# Patient Record
Sex: MALE | Race: WHITE | NOT HISPANIC OR LATINO | Employment: OTHER | URBAN - METROPOLITAN AREA
[De-identification: names, ages, dates, MRNs, and addresses within clinical notes are randomized per-mention and may not be internally consistent; named-entity substitution may affect disease eponyms.]

---

## 2023-08-31 ENCOUNTER — OFFICE VISIT (OUTPATIENT)
Dept: FAMILY MEDICINE | Facility: CLINIC | Age: 43
End: 2023-08-31
Payer: COMMERCIAL

## 2023-08-31 VITALS
DIASTOLIC BLOOD PRESSURE: 81 MMHG | TEMPERATURE: 99.1 F | OXYGEN SATURATION: 96 % | WEIGHT: 234.2 LBS | RESPIRATION RATE: 16 BRPM | HEIGHT: 70 IN | SYSTOLIC BLOOD PRESSURE: 122 MMHG | HEART RATE: 89 BPM | BODY MASS INDEX: 33.53 KG/M2

## 2023-08-31 DIAGNOSIS — J40 BRONCHITIS: ICD-10-CM

## 2023-08-31 DIAGNOSIS — G89.4 CHRONIC PAIN SYNDROME: ICD-10-CM

## 2023-08-31 DIAGNOSIS — T85.898A ABDOMINAL ADHESIONS DUE TO IMPLANTED MESH: ICD-10-CM

## 2023-08-31 DIAGNOSIS — K66.0 ABDOMINAL ADHESIONS DUE TO IMPLANTED MESH: ICD-10-CM

## 2023-08-31 DIAGNOSIS — R10.32 ABDOMINAL PAIN, LEFT LOWER QUADRANT: Primary | ICD-10-CM

## 2023-08-31 PROCEDURE — 99204 OFFICE O/P NEW MOD 45 MIN: CPT | Performed by: INTERNAL MEDICINE

## 2023-08-31 RX ORDER — VENLAFAXINE HYDROCHLORIDE 37.5 MG/1
CAPSULE, EXTENDED RELEASE ORAL
COMMUNITY
Start: 2023-01-05

## 2023-08-31 RX ORDER — AZITHROMYCIN 250 MG/1
TABLET, FILM COATED ORAL
Qty: 6 TABLET | Refills: 0 | Status: SHIPPED | OUTPATIENT
Start: 2023-08-31 | End: 2023-09-05

## 2023-08-31 RX ORDER — PREDNISONE 20 MG/1
40 TABLET ORAL DAILY
Qty: 10 TABLET | Refills: 0 | Status: SHIPPED | OUTPATIENT
Start: 2023-08-31 | End: 2023-09-05

## 2023-08-31 RX ORDER — OXYCODONE HYDROCHLORIDE 5 MG/1
5 TABLET ORAL 2 TIMES DAILY PRN
Qty: 12 TABLET | Refills: 0 | Status: SHIPPED | OUTPATIENT
Start: 2023-08-31 | End: 2023-09-06

## 2023-08-31 ASSESSMENT — PAIN SCALES - GENERAL: PAINLEVEL: EXTREME PAIN (9)

## 2023-08-31 NOTE — PROGRESS NOTES
Assessment & Plan     Abdominal pain, left lower quadrant  Has got chronic abdominal pain in the left lower quadrant  Started a couple of weeks after the surgery for his left inguinal hernia  He had a open hernia surgery with mesh placement  Since then he has been having pain  There is always a baseline pain of around 4 out of 10 and then whenever he does any exertional activity like exercise or sex it gets worse  Pain was so bad that he had to go to the ER sometimes  He was evaluated at [facility] and [facility] for this  He had multiple abdominal CTs which were unremarkable  They were wondering if the pain is from either scar tissue or entrapment of any nerve  He does not want to take any narcotics or gabapentin for pain as they make him drowsy  He wants to only take the oxycodone when it is absolutely necessary and he is in severe pain  He wants to get the mesh removed if possible and wants a second opinion about this pain  I told him that I can certainly consult the general surgery at [place] to see if they will evaluate him  The surgeon who performed his surgery Gabrielle has since retired so he was going to another surgeon in [facility] just for this pain and now he wants to shift his care to [place]  - Adult General Surg Referral; Future  - oxyCODONE (ROXICODONE) 5 MG tablet; Take 1 tablet (5 mg) by mouth 2 times daily as needed for pain    Abdominal adhesions due to implanted mesh  As above the etiology of this pain remains unclear but he is given history of a baseline constant pain  of 4 out of 10 which radiates up to his abdomen and down to the groin  Certainly they were considering elevations and also entrapment of any now as a possibility  - Adult General Surg Referral; Future    Chronic pain syndrome  He was referred to chronic pain service at [facility] but has not seen them  I see that they are considering ablation versus no blockage for this  I told him that he needs to look at other pain  "clinics as well  - Pain Management  Referral; Future  - oxyCODONE (ROXICODONE) 5 MG tablet; Take 1 tablet (5 mg) by mouth 2 times daily as needed for pain    Bronchitis  He has been having some cough and bringing up yellow phlegm and wheezing at times  Home COVID test is negative  He has bronchitis  - azithromycin (ZITHROMAX) 250 MG tablet; Take 2 tablets (500 mg) by mouth daily for 1 day, THEN 1 tablet (250 mg) daily for 4 days.  - predniSONE (DELTASONE) 20 MG tablet; Take 2 tablets (40 mg) by mouth daily for 5 days      30 minutes spent by me on the date of the encounter doing chart review, history and exam, documentation and further activities per the note       Nicotine/Tobacco Cessation:  He reports that he has been smoking cigarettes. His smokeless tobacco use includes chew.  Nicotine/Tobacco Cessation Plan:   Information offered: Patient not interested at this time      BMI:   Estimated body mass index is 33.6 kg/m  as calculated from the following:    Height as of this encounter: 1.778 m (5' 10\").    Weight as of this encounter: 106.2 kg (234 lb 3.2 oz).   Weight management plan: Discussed healthy diet and exercise guidelines        Edy Vela MD  [facility]    Amber Parra is a 43 year old, presenting for the following health issues:  Abdominal Pain      8/31/2023    12:31 PM   Additional Questions   Roomed by fortunato   Accompanied by self         8/31/2023    12:31 PM   Patient Reported Additional Medications   Patient reports taking the following new medications no       History of Present Illness       He eats 2-3 servings of fruits and vegetables daily.He consumes 2 sweetened beverage(s) daily.He exercises with enough effort to increase his heart rate 30 to 60 minutes per day.  He exercises with enough effort to increase his heart rate 5 days per week.   He is taking medications regularly.       Was seen 18 times with Replaced by [facility] system. Stated had " "hernia repair done. Having lower abdominal issue. Resting pain at 4/10. If moving or any activities 9-10/10. Feels like groin area being pulled. Request for referral to surgeon.           Review of Systems   Constitutional, HEENT, cardiovascular, pulmonary, gi and gu systems are negative, except as otherwise noted.      Objective    /81 (BP Location: Left arm, Patient Position: Sitting, Cuff Size: Adult Regular)   Pulse 89   Temp 99.1  F (37.3  C) (Oral)   Resp 16   Ht 1.778 m (5' 10\")   Wt 106.2 kg (234 lb 3.2 oz)   SpO2 96%   BMI 33.60 kg/m    Body mass index is 33.6 kg/m .  Physical Exam   GENERAL: healthy, alert and no distress  NECK: no adenopathy, no asymmetry, masses, or scars and thyroid normal to palpation  RESP: Scattered wheeze  CV: regular rate and rhythm, normal S1 S2, no S3 or S4, no murmur, click or rub, no peripheral edema and peripheral pulses strong  ABDOMEN: Tender in the left lower quadrant  There is an area of nodularity felt in the left lower quadrant where he had the surgery just above the inguinal ligament  The nodularity extends about 5 to 6 cm transversely  MS: no gross musculoskeletal defects noted, no edema                      "

## 2023-09-05 ENCOUNTER — TELEPHONE (OUTPATIENT)
Dept: FAMILY MEDICINE | Facility: CLINIC | Age: 43
End: 2023-09-05
Payer: COMMERCIAL

## 2023-09-05 NOTE — CONFIDENTIAL NOTE
Patient needs an appointment  He can come for in person appointment  He might need some imaging and blood work  Please call and schedule for in person appointment  Okay to take a same-day visit

## 2023-09-05 NOTE — TELEPHONE ENCOUNTER
Patient stated that the prednisone and Z-pack he had last week did help but now that it is finished he is still having symptoms.  Coughing along with coughing up phlegm.  Also stated he is having more difficulty breathing then when he was on the Z pack and prednisone.      Routing to provider to review and advise on if patient needs another appointment or if will prescribe refills on Prednisone and Z-pack.      Kristina Kjellberg, MSN, RN  [facility] Primary Care Triage

## 2023-09-05 NOTE — TELEPHONE ENCOUNTER
Called patient and relayed provider recommendation below, patient verbalized understanding. Patient requesting to be seen as soon as able by Dr. Vela -  let patient know appt available tomorrow at 1:40 pm at the [facility], patient states he'd like this appt - aware of location and appt time. No further questions or concerns.      PATRICE VazN, RN  [facility]

## 2023-09-05 NOTE — TELEPHONE ENCOUNTER
REFERRAL INFORMATION:  Referring Provider:  Dr. Edy Vela   Referring Clinic:  [facility] FP/IM/PEDS  Reason for Visit/Diagnosis: abdominal pain, h/o abdominal hernia       FUTURE VISIT INFORMATION:  Appointment Date: 09-11-23  Appointment Time: @ 12pm      NOTES RECORD STATUS  DETAILS   OFFICE NOTE from Referring Provider Internal 08-31-23 Dr. Edy Vela   OFFICE NOTE from Other Specialists Care Everywhere 07-20-23 Mac Martinez APRN CNP  07-13-23 Qing Gonzales PA-C   HOSPITAL DISCHARGE SUMMARY/ ED VISITS  NO    OPERATIVE REPORT NO    ENDOSCOPY (EGD)  Care everywhere 07-06-23   PERTINENT LABS Care Everywhere CBC/diff: 07-15-23, 06-13-23, 02-06-23, 02-12-22, 01-28-22  BMP: 07-15-23, 02-06-23, 02-12-22, 10-30-19   PATHOLOGY REPORTS (RELATED) Care Everywhere 07-06-23   IMAGING (CT, MRI, US, XR)  Care Everywhere CT abd/pelvis: 07-15-23, 06-23-23  US abd: 06-14-23, 04-12-18  XR abd: 06-13-23

## 2023-09-06 ENCOUNTER — OFFICE VISIT (OUTPATIENT)
Dept: FAMILY MEDICINE | Facility: CLINIC | Age: 43
End: 2023-09-06
Payer: COMMERCIAL

## 2023-09-06 ENCOUNTER — ANCILLARY PROCEDURE (OUTPATIENT)
Dept: GENERAL RADIOLOGY | Facility: CLINIC | Age: 43
End: 2023-09-06
Attending: INTERNAL MEDICINE
Payer: COMMERCIAL

## 2023-09-06 VITALS
HEIGHT: 70 IN | HEART RATE: 62 BPM | DIASTOLIC BLOOD PRESSURE: 80 MMHG | OXYGEN SATURATION: 95 % | WEIGHT: 230.1 LBS | TEMPERATURE: 98.6 F | SYSTOLIC BLOOD PRESSURE: 130 MMHG | RESPIRATION RATE: 16 BRPM | BODY MASS INDEX: 32.94 KG/M2

## 2023-09-06 DIAGNOSIS — J40 BRONCHITIS: ICD-10-CM

## 2023-09-06 DIAGNOSIS — J45.21 MILD INTERMITTENT ASTHMA WITH EXACERBATION: ICD-10-CM

## 2023-09-06 DIAGNOSIS — R06.02 SOB (SHORTNESS OF BREATH): Primary | ICD-10-CM

## 2023-09-06 DIAGNOSIS — R06.02 SOB (SHORTNESS OF BREATH): ICD-10-CM

## 2023-09-06 PROCEDURE — 71046 X-RAY EXAM CHEST 2 VIEWS: CPT | Mod: TC | Performed by: RADIOLOGY

## 2023-09-06 PROCEDURE — 99214 OFFICE O/P EST MOD 30 MIN: CPT | Performed by: INTERNAL MEDICINE

## 2023-09-06 RX ORDER — ALBUTEROL SULFATE 90 UG/1
2 AEROSOL, METERED RESPIRATORY (INHALATION) EVERY 6 HOURS PRN
Qty: 18 G | Refills: 3 | Status: SHIPPED | OUTPATIENT
Start: 2023-09-06 | End: 2024-01-16

## 2023-09-06 RX ORDER — DOXYCYCLINE HYCLATE 100 MG
100 TABLET ORAL 2 TIMES DAILY
Qty: 14 TABLET | Refills: 0 | Status: SHIPPED | OUTPATIENT
Start: 2023-09-06

## 2023-09-06 RX ORDER — BUDESONIDE 0.5 MG/2ML
0.5 INHALANT ORAL 2 TIMES DAILY
Qty: 60 ML | Refills: 0 | Status: SHIPPED | OUTPATIENT
Start: 2023-09-06

## 2023-09-06 ASSESSMENT — PAIN SCALES - GENERAL: PAINLEVEL: NO PAIN (0)

## 2023-09-06 NOTE — PROGRESS NOTES
Assessment & Plan     SOB (shortness of breath)  X-ray did not reveal any evidence of pneumonia  - XR Chest 2 Views; Future    Mild intermittent asthma with exacerbation  He has asthma which got exacerbated by current infection  He is having a lot of cough and wheezing  I will start him on some Pulmicort nebulizers as in the past when he had steroids that resulted in his immunity is going down and he catching in the pneumonia  Also for future reference he can use the Pulmicort inhaler as needed for a few days when his asthma gets worse  Currently he only takes albuterol inhaler as needed as that is doing the job until the current infection  - budesonide (PULMICORT) 0.5 MG/2ML neb solution; Take 2 mLs (0.5 mg) by nebulization 2 times daily  - albuterol (PROAIR HFA/PROVENTIL HFA/VENTOLIN HFA) 108 (90 Base) MCG/ACT inhaler; Inhale 2 puffs into the lungs every 6 hours as needed for shortness of breath, wheezing or cough  - budesonide (PULMICORT FLEXHALER) 90 MCG/ACT inhaler; Inhale 2 puffs into the lungs 2 times daily    Bronchitis  We tried a a Z-Reinier but he still having cough and discolored sputum  We will try some doxycycline  - doxycycline hyclate (VIBRA-TABS) 100 MG tablet; Take 1 tablet (100 mg) by mouth 2 times daily      25 minutes spent by me on the date of the encounter doing chart review, history and exam, documentation and further activities per the note           Edy Vela MD  [facility]    Amber Parra is a 43 year old, presenting for the following health issues:  Follow Up (Medication refill Prednisone and Z-pack.)        9/6/2023     1:31 PM   Additional Questions   Roomed by Aleida BURTON   Accompanied by self       History of Present Illness       He eats 2-3 servings of fruits and vegetables daily.He consumes 2 sweetened beverage(s) daily.He exercises with enough effort to increase his heart rate 30 to 60 minutes per day.  He exercises with enough effort to increase  "his heart rate 5 days per week.   He is taking medications regularly.               Review of Systems   Constitutional, HEENT, cardiovascular, pulmonary, gi and gu systems are negative, except as otherwise noted.      Objective    /80 (BP Location: Right arm, Patient Position: Sitting, Cuff Size: Adult Regular)   Pulse 62   Temp 98.6  F (37  C) (Oral)   Resp 16   Ht 1.778 m (5' 10\")   Wt 104.4 kg (230 lb 1.6 oz)   SpO2 95%   BMI 33.02 kg/m    Body mass index is 33.02 kg/m .  Physical Exam   GENERAL: healthy, alert and no distress  NECK: no adenopathy, no asymmetry, masses, or scars and thyroid normal to palpation  RESP: Scattered wheeze  CV: regular rate and rhythm, normal S1 S2, no S3 or S4, no murmur, click or rub, no peripheral edema and peripheral pulses strong  ABDOMEN: soft, nontender, no hepatosplenomegaly, no masses and bowel sounds normal  MS: no gross musculoskeletal defects noted, no edema                      "

## 2023-09-07 ENCOUNTER — TELEPHONE (OUTPATIENT)
Dept: FAMILY MEDICINE | Facility: CLINIC | Age: 43
End: 2023-09-07
Payer: COMMERCIAL

## 2023-09-07 DIAGNOSIS — J45.21 MILD INTERMITTENT ASTHMA WITH EXACERBATION: ICD-10-CM

## 2023-09-07 NOTE — TELEPHONE ENCOUNTER
Prior Authorization Retail Medication Request    Medication/Dose: budesonide (PULMICORT FLEXHALER) 90 MCG/ACT inhaler  ICD code (if different than what is on RX):     Previously Tried and Failed:    Rationale:    Mild intermittent asthma with exacerbatio          Insurance Name:  [employer]   Insurance ID:  [id number]       Pharmacy Information (if different than what is on RX)  Name:    Phone:       Go.[email or web address]/login  Key: [id number]  Last Name: sAh  : 1980

## 2023-09-08 NOTE — TELEPHONE ENCOUNTER
Central Prior Authorization Team   Phone: [phone]    PA Initiation    Medication: PULMICORT FLEXHALER 90 MCG/ACT IN AEPB  Insurance Company: [employer] - Phone [phone] Fax [phone]  Pharmacy Filling the Rx: [id number] [address] [facility]   Filling Pharmacy Phone: [phone]  Filling Pharmacy Fax:    Start Date: 9/8/2023

## 2023-09-08 NOTE — TELEPHONE ENCOUNTER
Prior Authorization Approval    Medication: PULMICORT FLEXHALER 90 MCG/ACT IN AEPB  Authorization Effective Date: 8/9/2023  Authorization Expiration Date: 9/7/2026  Insurance Company: [employer] - Phone [phone] Fax [phone]  Which Pharmacy is filling the prescription: [id number] [address] [facility]   Pharmacy Notified: Yes  Patient Notified: Yes (pharmacy will notify patient when ready)

## 2023-09-11 ENCOUNTER — MYC MEDICAL ADVICE (OUTPATIENT)
Dept: FAMILY MEDICINE | Facility: CLINIC | Age: 43
End: 2023-09-11

## 2023-09-11 ENCOUNTER — TELEPHONE (OUTPATIENT)
Dept: FAMILY MEDICINE | Facility: CLINIC | Age: 43
End: 2023-09-11

## 2023-09-11 ENCOUNTER — OFFICE VISIT (OUTPATIENT)
Dept: SURGERY | Facility: CLINIC | Age: 43
End: 2023-09-11
Attending: INTERNAL MEDICINE
Payer: COMMERCIAL

## 2023-09-11 ENCOUNTER — PRE VISIT (OUTPATIENT)
Dept: SURGERY | Facility: CLINIC | Age: 43
End: 2023-09-11

## 2023-09-11 VITALS
WEIGHT: 235.2 LBS | SYSTOLIC BLOOD PRESSURE: 122 MMHG | HEART RATE: 65 BPM | HEIGHT: 70 IN | OXYGEN SATURATION: 98 % | DIASTOLIC BLOOD PRESSURE: 78 MMHG | BODY MASS INDEX: 33.67 KG/M2

## 2023-09-11 DIAGNOSIS — T85.898A ABDOMINAL ADHESIONS DUE TO IMPLANTED MESH: ICD-10-CM

## 2023-09-11 DIAGNOSIS — K66.0 ABDOMINAL ADHESIONS DUE TO IMPLANTED MESH: ICD-10-CM

## 2023-09-11 DIAGNOSIS — R10.32 ABDOMINAL PAIN, LEFT LOWER QUADRANT: ICD-10-CM

## 2023-09-11 PROCEDURE — 99203 OFFICE O/P NEW LOW 30 MIN: CPT | Performed by: SURGERY

## 2023-09-11 ASSESSMENT — ENCOUNTER SYMPTOMS
NIGHT SWEATS: 0
HALLUCINATIONS: 0
HEARTBURN: 0
TASTE DISTURBANCE: 0
PANIC: 0
DISTURBANCES IN COORDINATION: 0
POLYDIPSIA: 0
SMELL DISTURBANCE: 0
BLOATING: 0
MUSCLE WEAKNESS: 1
BACK PAIN: 1
ALTERED TEMPERATURE REGULATION: 0
EXERCISE INTOLERANCE: 0
SEIZURES: 0
JAUNDICE: 0
LOSS OF CONSCIOUSNESS: 0
DIARRHEA: 0
DIFFICULTY URINATING: 0
FATIGUE: 0
INSOMNIA: 0
ABDOMINAL PAIN: 1
MUSCLE CRAMPS: 1
FLANK PAIN: 0
INCREASED ENERGY: 0
SYNCOPE: 0
NAUSEA: 0
SPUTUM PRODUCTION: 1
LEG PAIN: 0
NAIL CHANGES: 0
SORE THROAT: 0
SHORTNESS OF BREATH: 1
DECREASED CONCENTRATION: 0
WEIGHT LOSS: 0
NECK MASS: 0
LEG SWELLING: 0
DOUBLE VISION: 0
NECK PAIN: 0
ORTHOPNEA: 0
ARTHRALGIAS: 0
NERVOUS/ANXIOUS: 0
DYSPNEA ON EXERTION: 1
SINUS PAIN: 0
WEAKNESS: 0
MEMORY LOSS: 0
CONSTIPATION: 0
FEVER: 0
SKIN CHANGES: 0
WHEEZING: 1
EYE IRRITATION: 0
CHILLS: 0
HOARSE VOICE: 0
PALPITATIONS: 0
COUGH DISTURBING SLEEP: 1
SPEECH CHANGE: 0
TINGLING: 0
DYSURIA: 0
POOR WOUND HEALING: 0
TREMORS: 0
SLEEP DISTURBANCES DUE TO BREATHING: 0
EYE PAIN: 0
VOMITING: 0
HEMOPTYSIS: 0
DIZZINESS: 0
DECREASED APPETITE: 0
HEMATURIA: 0
SINUS CONGESTION: 0
CLAUDICATION: 0
DEPRESSION: 0
SNORES LOUDLY: 0
POLYPHAGIA: 0
BOWEL INCONTINENCE: 0
HYPOTENSION: 0
EYE REDNESS: 0
NUMBNESS: 0
SWOLLEN GLANDS: 0
HYPERTENSION: 0
RECTAL PAIN: 0
JOINT SWELLING: 0
BLOOD IN STOOL: 0
BRUISES/BLEEDS EASILY: 0
EYE WATERING: 0
COUGH: 1
TROUBLE SWALLOWING: 0
POSTURAL DYSPNEA: 1
STIFFNESS: 0
TACHYCARDIA: 0
MYALGIAS: 1
HEADACHES: 0
WEIGHT GAIN: 0
EXTREMITY NUMBNESS: 0
PARALYSIS: 0
LIGHT-HEADEDNESS: 0

## 2023-09-11 ASSESSMENT — PAIN SCALES - GENERAL: PAINLEVEL: MODERATE PAIN (4)

## 2023-09-11 NOTE — NURSING NOTE
"Chief Complaint   Patient presents with    New Patient     Abdominal pain       Vitals:    09/11/23 1151   BP: 122/78   BP Location: Left arm   Patient Position: Sitting   Cuff Size: Adult Large   Pulse: 65   SpO2: 98%   Weight: 106.7 kg (235 lb 3.2 oz)   Height: 1.778 m (5' 10\")       Body mass index is 33.75 kg/m .                          Maximilian Orosco, EMT    "

## 2023-09-11 NOTE — TELEPHONE ENCOUNTER
"  General Call      Reason for Call:  Patient     What are your questions or concerns:  Patient went to his surgical consult today and he wants to know who else he can go to as the surgeon he met today will not remove his \"hernia mesh\".    Date of last appointment with provider: 9/6/2023. 8/31/2023 when referral was placed    Could we send this information to you in [facility] or would you prefer to receive a phone call?:   Patient would prefer a phone call   Okay to leave a detailed message?: Yes at Cell number on file:    Telephone Information:   Mobile [phone]     Demetria Villalobos [facility]   Primary Care    "

## 2023-09-11 NOTE — LETTER
"9/11/2023       RE: Fidel Aleman  [address]  [place] [address]     Dear Colleague,    Thank you for referring your patient, Fidel Aleman, to the [facility] GENER[facility] [place] at [facility]. Please see a copy of my visit note below.    New General Surgery Consultation Note        Fidel Aleman  [id number]  1980 September 11, 2023     Requesting Provider: Edy Vela     Dear Dr Moran, [facility],     I had the pleasure of seeing your patient, Fidel Aleman is a 43 year old male who presents to clinic today for the following health issues      CHIEF COMPLAINT:  L groin pain    Assessment & Plan  Problem List Items Addressed This Visit    None  Visit Diagnoses       Abdominal pain, left lower quadrant        Relevant Orders    PAIN INJECTION EVAL/TREAT/FOLLOW UP    Abdominal adhesions due to implanted mesh             30 minutes spent by me on the date of the encounter doing chart review, history and exam, documentation and further activities per the note    [facility] groin pain s/p open IHR 2021. Explained thoroughly the challenge in taking mesh out, due to scarring. Explained Plan A would be pain injections in the area. If no relief, would discuss case at [facility] prior to offering mesh removal. I explained the inherent risks of mesh removal, including structural injury or persistence of pain.      Alphonso Reis MD      HISTORY OF PRESENT ILLNESS:  43M, hx sobriety x 2 years, s/p open L IH mesh 2021, Dr Hunter    Presents with chronic pain in the L groin, limiting his ability to move around, do activities he likes. He also notes a plapable L groin \"ridge\". He is quite frustrated at this chronic pain, which has been without effective relief.      LOCATION:   L groin        Review of Systems     Constitutional:  Negative for fever, chills, weight loss, weight gain, fatigue, decreased appetite, " night sweats, recent stressors, height gain, height loss, post-operative complications, incisional pain, hallucinations, increased energy, hyperactivity and confused.   HENT:  Negative for ear pain, hearing loss, tinnitus, nosebleeds, trouble swallowing, hoarse voice, mouth sores, sore throat, ear discharge, tooth pain, gum tenderness, taste disturbance, smell disturbance, hearing aid, bleeding gums, dry mouth, sinus pain, sinus congestion and neck mass.    Eyes:  Negative for double vision, eye pain, redness, eye pain, decreased vision, eye watering, eye bulging, eye dryness, flashing lights, spots, floaters, strabismus, tunnel vision, jaundice and eye irritation.   Respiratory:   Positive for cough, sputum production, shortness of breath, wheezing, dyspnea on exertion, cough disturbing sleep and postural dyspnea. Negative for hemoptysis, sleep disturbances due to breathing and snores loudly.    Cardiovascular:  Positive for dyspnea on exertion. Negative for chest pain, palpitations, orthopnea, claudication, leg swelling, fingers/toes turn blue, hypertension, hypotension, syncope, history of heart murmur, chest pain on exertion, chest pain at rest, pacemaker, few scattered varicosities, leg pain, sleep disturbances due to breathing, tachycardia, light-headedness, exercise intolerance and edema.   Gastrointestinal:  Positive for abdominal pain. Negative for heartburn, nausea, vomiting, diarrhea, constipation, blood in stool, melena, rectal pain, bloating, bowel incontinence, jaundice, coffee ground emesis and change in stool.   Genitourinary:  Negative for bladder incontinence, dysuria, urgency, hematuria, flank pain, difficulty urinating, nocturia, voiding less frequently, scrotal pain, ulcerations, penile discharge, male genitourinary complaint and reduced libido.   Musculoskeletal:  Positive for myalgias, back pain, muscle cramps and muscle weakness. Negative for joint swelling, arthralgias, stiffness, neck pain,  "bone pain and fracture.   Skin:  Negative for nail changes, itching, poor wound healing, rash, hair changes, skin changes, acne, warts, poor wound healing, scarring, flaky skin, Raynaud's phenomenon, sensitivity to sunlight and skin thickening.   Neurological:  Negative for dizziness, tingling, tremors, speech change, seizures, loss of consciousness, weakness, light-headedness, numbness, headaches, disturbances in coordination, extremity numbness, memory loss, difficulty walking and paralysis.   Endo/Heme:  Negative for anemia, swollen glands and bruises/bleeds easily.   Psychiatric/Behavioral:  Negative for depression, hallucinations, memory loss, decreased concentration, mood swings and panic attacks.    Endocrine:  Negative for altered temperature regulation, polyphagia, polydipsia, unwanted hair growth and change in facial hair.      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 Neurofibromas both lower legs.    Metabolic syndrome 01/03/2012   GERD (gastroesophageal reflux disease) 01/03/2012   Abdominal cramping 01/03/2012   Overview Note:   Abdominal wall ? Onset approximately Jan 2011    Mild intermittent asthma ([facility]) 12/13/2011   Morbid obesity ([facility]) 09/01/2011   Overview Note:   BMI 45      Social History     Socioeconomic History   Marital status: Unknown   Spouse name: Not on file   Number of children: Not on file   Years of education: Not on file   Highest education level: Not on file   Occupational History   Occupation: Contractor - Owns construction company   Tobacco Use   Smoking status: Former   Packs/day: 0.10   Years: 6.00   Pack years: 0.60   Types: Cigarettes " "  Smokeless tobacco: Current   Types: Chew   Tobacco comments:   2 1/2 years ago quit   Vaping Use   Vaping Use: Never used   Substance and Sexual Activity   Alcohol use: No   Drug use: No   Sexual activity: Not on file   Other Topics Concern   Not on file   Social History Narrative   Not on file     Social Determinants of Health     Financial Resource Strain: Not on file   Food Insecurity: Not on file   Transportation Needs: Not on file      MEDICATIONS:  Current Outpatient Medications   Medication    albuterol (PROAIR HFA/PROVENTIL HFA/VENTOLIN HFA) 108 (90 Base) MCG/ACT inhaler    budesonide (PULMICORT FLEXHALER) 90 MCG/ACT inhaler    budesonide (PULMICORT) 0.5 MG/2ML neb solution    doxycycline hyclate (VIBRA-TABS) 100 MG tablet    venlafaxine (EFFEXOR XR) 37.5 MG 24 hr capsule     No current facility-administered medications for this visit.        ALLERGIES:  Allergies   Allergen Reactions    Hydrocodone-Acetaminophen Itching, Nausea and GI Disturbance    Penicillins Unknown     amoxicillin is ok and has tried        SOCIAL HISTORY:  Social History     Socioeconomic History    Marital status:      Spouse name: None    Number of children: None    Years of education: None    Highest education level: None   Tobacco Use    Smoking status: Some Days     Types: Cigarettes    Smokeless tobacco: Current     Types: Chew    Tobacco comments:     Couple cigs a day   Vaping Use    Vaping Use: Never used       FAMILY HISTORY:  No family history on file.     CT reviewed    PHYSICAL EXAM:  Objective   /78 (BP Location: Left arm, Patient Position: Sitting, Cuff Size: Adult Large)   Pulse 65   Ht 1.778 m (5' 10\")   Wt 106.7 kg (235 lb 3.2 oz)   SpO2 98%   BMI 33.75 kg/m    /78 (BP Location: Left arm, Patient Position: Sitting, Cuff Size: Adult Large)   Pulse 65   Ht 1.778 m (5' 10\")   Wt 106.7 kg (235 lb 3.2 oz)   SpO2 98%   BMI 33.75 kg/m    Body mass index is 33.75 kg/m .  Physical " "Exam  Musculoskeletal:         General: No edema.        L groin with scar, no palp defect. Palpable \"Ridge\" of tissue          Again, thank you for allowing me to participate in the care of your patient.      Sincerely,    Alphonso Reis MD    "

## 2023-09-11 NOTE — PROGRESS NOTES
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 Explained thoroughly the challenge in taking mesh out, due to scarring. Explained Plan A would be pain injections in the area. If no relief, would discuss case at [facility] prior to offering mesh removal. I explained the inherent risks of mesh removal, including structural injury or persistence of pain.      Alphonso Reis MD      HISTORY OF PRESENT ILLNESS:  43M, hx sobriety x 2 years, s/p open L IH mesh 2021, Dr Hunter    Presents with chronic pain in the L groin, limiting his ability to move around, do activities he likes. He also notes a plapable L groin \"ridge\". He is quite frustrated at this chronic pain, which has been without effective relief.      LOCATION:   L groin        Review of Systems     Constitutional:  Negative for fever, chills, weight loss, weight gain, fatigue, decreased appetite, night sweats, recent stressors, height gain, height loss, post-operative complications, incisional pain, hallucinations, increased energy, hyperactivity and confused.   HENT:  Negative for ear pain, hearing loss, tinnitus, nosebleeds, trouble swallowing, hoarse voice, mouth sores, sore throat, ear discharge, tooth pain, gum " tenderness, taste disturbance, smell disturbance, hearing aid, bleeding gums, dry mouth, sinus pain, sinus congestion and neck mass.    Eyes:  Negative for double vision, eye pain, redness, eye pain, decreased vision, eye watering, eye bulging, eye dryness, flashing lights, spots, floaters, strabismus, tunnel vision, jaundice and eye irritation.   Respiratory:   Positive for cough, sputum production, shortness of breath, wheezing, dyspnea on exertion, cough disturbing sleep and postural dyspnea. Negative for hemoptysis, sleep disturbances due to breathing and snores loudly.    Cardiovascular:  Positive for dyspnea on exertion. Negative for chest pain, palpitations, orthopnea, claudication, leg swelling, fingers/toes turn blue, hypertension, hypotension, syncope, history of heart murmur, chest pain on exertion, chest pain at rest, pacemaker, few scattered varicosities, leg pain, sleep disturbances due to breathing, tachycardia, light-headedness, exercise intolerance and edema.   Gastrointestinal:  Positive for abdominal pain. Negative for heartburn, nausea, vomiting, diarrhea, constipation, blood in stool, melena, rectal pain, bloating, bowel incontinence, jaundice, coffee ground emesis and change in stool.   Genitourinary:  Negative for bladder incontinence, dysuria, urgency, hematuria, flank pain, difficulty urinating, nocturia, voiding less frequently, scrotal pain, ulcerations, penile discharge, male genitourinary complaint and reduced libido.   Musculoskeletal:  Positive for myalgias, back pain, muscle cramps and muscle weakness. Negative for joint swelling, arthralgias, stiffness, neck pain, bone pain and fracture.   Skin:  Negative for nail changes, itching, poor wound healing, rash, hair changes, skin changes, acne, warts, poor wound healing, scarring, flaky skin, Raynaud's phenomenon, sensitivity to sunlight and skin thickening.   Neurological:  Negative for dizziness, tingling, tremors, speech change,  "seizures, loss of consciousness, weakness, light-headedness, numbness, headaches, disturbances in coordination, extremity numbness, memory loss, difficulty walking and paralysis.   Endo/Heme:  Negative for anemia, swollen glands and bruises/bleeds easily.   Psychiatric/Behavioral:  Negative for depression, hallucinations, memory loss, decreased concentration, mood swings and panic attacks.    Endocrine:  Negative for altered temperature regulation, polyphagia, polydipsia, unwanted hair growth and change in facial hair.      atient Active Problem List   Diagnosis Date Noted   Dyslipidemia ([facility]) 09/01/2011   Overview Note:   HDL 27, Trigs 400    Anxiety ([facility]) 01/05/2023   Mild intermittent asthma with (acute) exacerbation ([facility]) 01/10/2022   Injury of lower back 11/29/2012   Overview Note:   ICD 10    Gout 02/19/2012   Neurofibromatosis ([facility]) 01/03/2012   Overview Note:   3\" cafe au lait spot in RLQ. Neurofibromas both lower legs.    Metabolic syndrome 01/03/2012   GERD (gastroesophageal reflux disease) 01/03/2012   Abdominal cramping 01/03/2012   Overview Note:   Abdominal wall ? Onset approximately Jan 2011    Mild intermittent asthma ([facility]) 12/13/2011   Morbid obesity ([facility]) 09/01/2011   Overview Note:   BMI 45      Social History     Socioeconomic History   Marital status: Unknown   Spouse name: Not on file   Number of children: Not on file   Years of education: Not on file   Highest education level: Not on file   Occupational History   Occupation: Contractor - [employer]   Tobacco Use   Smoking status: Former   Packs/day: 0.10   Years: 6.00   Pack years: 0.60   Types: Cigarettes   Smokeless tobacco: Current   Types: Chew   Tobacco comments:   2 1/2 years ago quit   Vaping Use   Vaping Use: Never used   Substance and Sexual Activity   Alcohol use: No   Drug use: No   Sexual activity: Not on file   Other Topics Concern   Not on file   Social History Narrative   Not on file     Social Determinants of " "Health     Financial Resource Strain: Not on file   Food Insecurity: Not on file   Transportation Needs: Not on file      MEDICATIONS:  Current Outpatient Medications   Medication    albuterol (PROAIR HFA/PROVENTIL HFA/VENTOLIN HFA) 108 (90 Base) MCG/ACT inhaler    budesonide (PULMICORT FLEXHALER) 90 MCG/ACT inhaler    budesonide (PULMICORT) 0.5 MG/2ML neb solution    doxycycline hyclate (VIBRA-TABS) 100 MG tablet    venlafaxine (EFFEXOR XR) 37.5 MG 24 hr capsule     No current facility-administered medications for this visit.        ALLERGIES:  Allergies   Allergen Reactions    Hydrocodone-Acetaminophen Itching, Nausea and GI Disturbance    Penicillins Unknown     amoxicillin is ok and has tried        SOCIAL HISTORY:  Social History     Socioeconomic History    Marital status:      Spouse name: None    Number of children: None    Years of education: None    Highest education level: None   Tobacco Use    Smoking status: Some Days     Types: Cigarettes    Smokeless tobacco: Current     Types: Chew    Tobacco comments:     Couple cigs a day   Vaping Use    Vaping Use: Never used       FAMILY HISTORY:  No family history on file.     CT reviewed    PHYSICAL EXAM:  Objective    /78 (BP Location: Left arm, Patient Position: Sitting, Cuff Size: Adult Large)   Pulse 65   Ht 1.778 m (5' 10\")   Wt 106.7 kg (235 lb 3.2 oz)   SpO2 98%   BMI 33.75 kg/m    /78 (BP Location: Left arm, Patient Position: Sitting, Cuff Size: Adult Large)   Pulse 65   Ht 1.778 m (5' 10\")   Wt 106.7 kg (235 lb 3.2 oz)   SpO2 98%   BMI 33.75 kg/m    Body mass index is 33.75 kg/m .  Physical Exam  Musculoskeletal:         General: No edema.        L groin with scar, no palp defect. Palpable \"Ridge\" of tissue      Sincerely,     Alphonso Reis MD    "

## 2023-09-12 NOTE — TELEPHONE ENCOUNTER
See MyC message from yesterday evening, Dr. Vela has already responded to this in a separate MyC message and is addressing it. Will close this encounter to avoid duplicates.      PATRICE VazN, RN  [facility]

## 2023-09-24 ENCOUNTER — HEALTH MAINTENANCE LETTER (OUTPATIENT)
Age: 43
End: 2023-09-24

## 2023-09-26 DIAGNOSIS — R10.32 LEFT GROIN PAIN: Primary | ICD-10-CM

## 2023-12-24 DIAGNOSIS — J45.21 MILD INTERMITTENT ASTHMA WITH EXACERBATION: ICD-10-CM

## 2023-12-26 RX ORDER — BUDESONIDE 90 UG/1
2 AEROSOL, POWDER RESPIRATORY (INHALATION) 2 TIMES DAILY
Qty: 1 EACH | Refills: 0 | Status: SHIPPED | OUTPATIENT
Start: 2023-12-26 | End: 2024-01-18

## 2024-01-16 DIAGNOSIS — J45.21 MILD INTERMITTENT ASTHMA WITH EXACERBATION: ICD-10-CM

## 2024-01-16 RX ORDER — ALBUTEROL SULFATE 90 UG/1
AEROSOL, METERED RESPIRATORY (INHALATION)
Qty: 18 G | Refills: 3 | Status: SHIPPED | OUTPATIENT
Start: 2024-01-16 | End: 2024-08-05

## 2024-01-18 DIAGNOSIS — J45.21 MILD INTERMITTENT ASTHMA WITH EXACERBATION: ICD-10-CM

## 2024-01-18 RX ORDER — BUDESONIDE 90 UG/1
2 AEROSOL, POWDER RESPIRATORY (INHALATION) 2 TIMES DAILY
Qty: 1 EACH | Refills: 1 | Status: SHIPPED | OUTPATIENT
Start: 2024-01-18

## 2024-04-08 DIAGNOSIS — J45.21 MILD INTERMITTENT ASTHMA WITH EXACERBATION: ICD-10-CM

## 2024-04-08 RX ORDER — ALBUTEROL SULFATE 90 UG/1
AEROSOL, METERED RESPIRATORY (INHALATION)
Qty: 18 G | Refills: 3 | OUTPATIENT
Start: 2024-04-08

## 2024-04-30 NOTE — TELEPHONE ENCOUNTER
Patient Quality Outreach    Patient is due for the following:   Asthma  -  ACT needed  Physical Preventive Adult Physical      Topic Date Due    Pneumococcal Vaccine (1 of 2 - PCV) Never done    Hepatitis B Vaccine (1 of 3 - 19+ 3-dose series) Never done    Flu Vaccine (1) Never done    COVID-19 Vaccine (1 - 2023-24 season) Never done       Next Steps:   Schedule a Adult Preventative    Type of outreach:    Sent [employer] message.      Questions for provider review:    None           Carmen Greenberg

## 2024-08-05 DIAGNOSIS — J45.21 MILD INTERMITTENT ASTHMA WITH EXACERBATION: ICD-10-CM

## 2024-08-05 RX ORDER — ALBUTEROL SULFATE 90 UG/1
AEROSOL, METERED RESPIRATORY (INHALATION)
Qty: 18 G | Refills: 3 | Status: CANCELLED | OUTPATIENT
Start: 2024-08-05

## 2024-08-05 RX ORDER — LEVALBUTEROL TARTRATE 45 UG/1
2 AEROSOL, METERED ORAL EVERY 4 HOURS PRN
Qty: 15 G | Refills: 0 | Status: SHIPPED | OUTPATIENT
Start: 2024-08-05

## 2024-11-17 ENCOUNTER — HEALTH MAINTENANCE LETTER (OUTPATIENT)
Age: 44
End: 2024-11-17

## 2025-07-08 ENCOUNTER — NURSE TRIAGE (OUTPATIENT)
Dept: FAMILY MEDICINE | Facility: CLINIC | Age: 45
End: 2025-07-08

## 2025-07-08 NOTE — TELEPHONE ENCOUNTER
Nurse Triage SBAR    Is this a 2nd Level Triage? NO    Situation: patient calling in he has an appointment today but wondering if they would be able to do any imaging. Patient woke up with a very swollen groin area and painful.     Background: just happened today     Assessment:     Sharp pain in groin are area/Very high inner thigh    Swollen the size of a grapefruit- also stated there was a purple strip   Pain- 8/10- touch it painful, walking around hurts         Protocol Recommended Disposition:   Go To Office Now    Recommendation: patient preferred to go to the ED so that any imaging could be ordered. Cancelled appointment that was scheduled with a provider today.        Does the patient meet one of the following criteria for ADS visit consideration? No    Reason for Disposition   SEVERE swelling (e.g., swelling extends above knee, entire leg is swollen, weeping fluid)    Additional Information   Negative: Sounds like a life-threatening emergency to the triager   Negative: Chest pain   Negative: Followed an insect bite and has localized swelling (e.g., small area of puffy or swollen skin)   Negative: Followed a knee injury   Negative: Ankle injury   Negative: Pregnant with leg swelling or edema   Negative: Difficulty breathing at rest   Negative: Entire foot is cool or blue in comparison to other side   Negative: Cast on leg or ankle and has increasing pain   Negative: Can't walk or can barely stand (new-onset)   Negative: Fever and red area (or area very tender to touch)   Negative: Patient sounds very sick or weak to the triager    Protocols used: Leg Swelling and Edema-A-OH